# Patient Record
Sex: MALE | Race: WHITE | URBAN - METROPOLITAN AREA
[De-identification: names, ages, dates, MRNs, and addresses within clinical notes are randomized per-mention and may not be internally consistent; named-entity substitution may affect disease eponyms.]

---

## 2019-03-11 ENCOUNTER — EMERGENCY (EMERGENCY)
Facility: HOSPITAL | Age: 59
LOS: 1 days | End: 2019-03-11
Payer: COMMERCIAL

## 2019-03-11 VITALS
TEMPERATURE: 98 F | OXYGEN SATURATION: 98 % | HEART RATE: 76 BPM | RESPIRATION RATE: 16 BRPM | DIASTOLIC BLOOD PRESSURE: 85 MMHG | SYSTOLIC BLOOD PRESSURE: 126 MMHG

## 2019-03-11 PROCEDURE — 93010 ELECTROCARDIOGRAM REPORT: CPT

## 2019-03-11 PROCEDURE — 99284 EMERGENCY DEPT VISIT MOD MDM: CPT | Mod: 25

## 2019-03-11 PROCEDURE — 93005 ELECTROCARDIOGRAM TRACING: CPT

## 2019-03-11 PROCEDURE — 99283 EMERGENCY DEPT VISIT LOW MDM: CPT

## 2019-03-12 NOTE — ED ADULT NURSE REASSESSMENT NOTE - NS ED NURSE REASSESS COMMENT FT1
Patient refusing to go to CXR. States "I came here for an U/S of my leg, I'm going to have t pass this up". MD logan aware.

## 2019-03-12 NOTE — ED PROVIDER NOTE - OBJECTIVE STATEMENT
59yo M no pmh presenting with intermittent RLE pain at calf(focal) and 1 episode of 15mins of non-exertional, non-radiating cp at 8pm. Self-resolved, happened at rest, better with exertion. Pain in RLE at calf happened 5 times throughout day, seconds at a time, no triggers.

## 2019-03-12 NOTE — ED PROVIDER NOTE - CLINICAL SUMMARY MEDICAL DECISION MAKING FREE TEXT BOX
CP, unlikely MSK. RLE pain, exam unremarkable, no risk factors for clot. ACS, ekg, trop, labs, cxr. CP, unlikely MSK. RLE pain, exam unremarkable, no risk factors for clot. EKG CP, unlikely MSK. RLE pain, exam unremarkable, no risk factors for clot. EKG    LWBS by attending

## 2019-03-12 NOTE — ED ADULT NURSE REASSESSMENT NOTE - NS ED NURSE REASSESS COMMENT FT1
Patient reports he is leaving the hospital, NO Iv intact. States "I changed my mind I ant to see my cardiologist at [facility] in the morning" and continues to refuse treatment and testing regardless of education by primary RN Mary Beth. Patient was not seen by Attending MD Douglas, MD Douglas made aware. Patient is leaving ED at this time.

## 2019-03-12 NOTE — ED ADULT NURSE NOTE - OBJECTIVE STATEMENT
Patient is a 58 year old male complaining of intermittent RLE pain at calf today. Arrived by walk-in from triage. Patient has no pmhx. Patient is A&O x 4 and appears well. Pt reports pain was 1 15 min episode, that resolved on its own at rest, got better with exercise. Pain continued to come and go for several seconds at a time throughout the course of the day. Denies complaints of chest pain, sob, fevers, chills, n/v/d, headache, syncope, burning urination, blood in urine, blood in stool. Abd is soft, non tender, non distended. Skin is warm and dry. +DP pulses +2. Ketan legs appear WNL. Color is consistent with ethnicity. NAD. Safety and comfort maintained. Will continue to monitor.

## 2019-11-20 ENCOUNTER — TRANSCRIPTION ENCOUNTER (OUTPATIENT)
Age: 59
End: 2019-11-20

## 2020-12-28 ENCOUNTER — TRANSCRIPTION ENCOUNTER (OUTPATIENT)
Age: 60
End: 2020-12-28

## 2020-12-29 ENCOUNTER — TRANSCRIPTION ENCOUNTER (OUTPATIENT)
Age: 60
End: 2020-12-29

## 2022-02-04 ENCOUNTER — TRANSCRIPTION ENCOUNTER (OUTPATIENT)
Age: 62
End: 2022-02-04

## 2022-10-19 ENCOUNTER — APPOINTMENT (OUTPATIENT)
Dept: PHYSICAL MEDICINE AND REHAB | Facility: CLINIC | Age: 62
End: 2022-10-19

## 2022-10-19 ENCOUNTER — APPOINTMENT (OUTPATIENT)
Dept: ORTHOPEDIC SURGERY | Facility: CLINIC | Age: 62
End: 2022-10-19

## 2022-10-19 PROBLEM — Z00.00 ENCOUNTER FOR PREVENTIVE HEALTH EXAMINATION: Status: ACTIVE | Noted: 2022-10-19

## 2023-07-25 ENCOUNTER — EMERGENCY (EMERGENCY)
Facility: HOSPITAL | Age: 63
LOS: 0 days | Discharge: HOME | End: 2023-07-25
Attending: EMERGENCY MEDICINE
Payer: MEDICAID

## 2023-07-25 VITALS
HEART RATE: 66 BPM | RESPIRATION RATE: 17 BRPM | OXYGEN SATURATION: 98 % | TEMPERATURE: 98 F | SYSTOLIC BLOOD PRESSURE: 115 MMHG | DIASTOLIC BLOOD PRESSURE: 66 MMHG

## 2023-07-25 VITALS — HEIGHT: 74 IN | WEIGHT: 184.97 LBS

## 2023-07-25 DIAGNOSIS — Z23 ENCOUNTER FOR IMMUNIZATION: ICD-10-CM
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
## 2023-07-25 DIAGNOSIS — S06.9X1A UNSPECIFIED INTRACRANIAL INJURY WITH LOSS OF CONSCIOUSNESS OF 30 MINUTES OR LESS, INITIAL ENCOUNTER: ICD-10-CM

## 2023-07-25 DIAGNOSIS — S92.532A DISPLACED FRACTURE OF DISTAL PHALANX OF LEFT LESSER TOE(S), INITIAL ENCOUNTER FOR CLOSED FRACTURE: ICD-10-CM
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
## 2023-07-25 PROCEDURE — 70486 CT MAXILLOFACIAL W/O DYE: CPT | Mod: MA

## 2023-07-25 PROCEDURE — 72125 CT NECK SPINE W/O DYE: CPT | Mod: MA

## 2023-07-25 PROCEDURE — 90715 TDAP VACCINE 7 YRS/> IM: CPT

## 2023-07-25 PROCEDURE — 70450 CT HEAD/BRAIN W/O DYE: CPT | Mod: 26,MA

## 2023-07-25 PROCEDURE — 72125 CT NECK SPINE W/O DYE: CPT | Mod: 26,MA

## 2023-07-25 PROCEDURE — 73660 X-RAY EXAM OF TOE(S): CPT | Mod: LT

## 2023-07-25 PROCEDURE — 70450 CT HEAD/BRAIN W/O DYE: CPT | Mod: MA

## 2023-07-25 PROCEDURE — 99284 EMERGENCY DEPT VISIT MOD MDM: CPT | Mod: 25

## 2023-07-25 PROCEDURE — 73660 X-RAY EXAM OF TOE(S): CPT | Mod: 26,LT

## 2023-07-25 PROCEDURE — 76376 3D RENDER W/INTRP POSTPROCES: CPT

## 2023-07-25 PROCEDURE — 99285 EMERGENCY DEPT VISIT HI MDM: CPT

## 2023-07-25 PROCEDURE — 70486 CT MAXILLOFACIAL W/O DYE: CPT | Mod: 26,MA

## 2023-07-25 PROCEDURE — 76376 3D RENDER W/INTRP POSTPROCES: CPT | Mod: 26

## 2023-07-25 PROCEDURE — 90471 IMMUNIZATION ADMIN: CPT

## 2023-07-25 RX ORDER — CEPHALEXIN 500 MG
500 CAPSULE ORAL ONCE
Refills: 0 | Status: COMPLETED | OUTPATIENT
Start: 2023-07-25 | End: 2023-07-25

## 2023-07-25 RX ORDER — IBUPROFEN 200 MG
1 TABLET ORAL
Qty: 18 | Refills: 0
Start: 2023-07-25 | End: 2023-07-30

## 2023-07-25 RX ORDER — IBUPROFEN 200 MG
600 TABLET ORAL ONCE
Refills: 0 | Status: COMPLETED | OUTPATIENT
Start: 2023-07-25 | End: 2023-07-25

## 2023-07-25 RX ORDER — TETANUS TOXOID, REDUCED DIPHTHERIA TOXOID AND ACELLULAR PERTUSSIS VACCINE, ADSORBED 5; 2.5; 8; 8; 2.5 [IU]/.5ML; [IU]/.5ML; UG/.5ML; UG/.5ML; UG/.5ML
0.5 SUSPENSION INTRAMUSCULAR ONCE
Refills: 0 | Status: COMPLETED | OUTPATIENT
Start: 2023-07-25 | End: 2023-07-25

## 2023-07-25 RX ORDER — CEPHALEXIN 500 MG
1 CAPSULE ORAL
Qty: 28 | Refills: 0
Start: 2023-07-25 | End: 2023-07-31

## 2023-07-25 RX ADMIN — TETANUS TOXOID, REDUCED DIPHTHERIA TOXOID AND ACELLULAR PERTUSSIS VACCINE, ADSORBED 0.5 MILLILITER(S): 5; 2.5; 8; 8; 2.5 SUSPENSION INTRAMUSCULAR at 20:12

## 2023-07-25 RX ADMIN — Medication 500 MILLIGRAM(S): at 21:58

## 2023-07-25 RX ADMIN — Medication 600 MILLIGRAM(S): at 20:11

## 2023-07-25 NOTE — ED PROVIDER NOTE - PATIENT PORTAL LINK FT
You can access the FollowMyHealth Patient Portal offered by [facility] by registering at the following website: [email or web address]. By joining [employer]’s FollowMyHealth portal, you will also be able to view your health information using other applications (apps) compatible with our system.

## 2023-07-25 NOTE — ED ADULT NURSE NOTE - OBJECTIVE STATEMENT
pt c/o head injury and left foot pain/injury s/p wood fell down from ceiling/floor above him, hitting his head and landed on foot.  pt on plavix.  +LOC for 2 sec.

## 2023-07-25 NOTE — ED PROVIDER NOTE - CARE PROVIDER_API CALL
Max Chicas  Podiatric Medicine and Surgery  [address] [place] [id number]  Phone: [phone]  Fax: [phone]  Follow Up Time:

## 2023-07-25 NOTE — ED PROVIDER NOTE - OBJECTIVE STATEMENT
62 yo male with no pertinent PMHx presents to ED c/o head injury and left foot pain/injury s/p wood fell down from ceiling/floor above him. +head strike, landed on foot. on plavix. +LOC for 2 sec. 62 yo male with no pertinent PMHx presents to ED c/o head injury and left foot pain/injury s/p wood fell down from ceiling/floor above him around 2:50p today. +head strike, +LOC for about 2 sec. landed on foot. on plavix. tdap not utd. NKDA.

## 2023-07-25 NOTE — CONSULT NOTE ADULT - SUBJECTIVE AND OBJECTIVE BOX
CC: Trauma to left 3rd toe     HPI: 64 yo male with no pertinent PMHx presents to ED c/o head injury and left foot pain/injury s/p wood fell down from ceiling/floor above him around 2:50p today. +head strike, +LOC for about 2 sec. landed on foot. on plavix. tdap not utd. NKDA.    Podiatry was consulted for left 3rd toe trauma. Pt states he injured his 3rd left toe around 3pm when wooden piece fell on his head and then hit his head. Pt was in lot of pain and noticed bleeding from the site of injury.  Denies any other pedal complaints.   Denies fever/nausea/SOB    PMH: No pertinent past medical history      PSH:No significant past surgical history        Allergies:No Known Allergies            T(F): 98.5 (07-25-23 @ 18:25), Max: 98.5 (07-25-23 @ 18:25)  HR: 69 (07-25-23 @ 18:25) (69 - 69)  BP: 111/67 (07-25-23 @ 18:25) (111/67 - 111/67)  RR: 18 (07-25-23 @ 18:25) (18 - 18)  SpO2: 98% (07-25-23 @ 18:25) (98% - 98%)  Wt(kg): --    REVIEW OF SYSTEMS:    CONSTITUTIONAL: No weakness, fevers or chills  EYES: No visual changes  RESPIRATORY: No cough, wheezing; No shortness of breath  CARDIOVASCULAR: No chest pain or palpitations  GASTROINTESTINAL: No abdominal or epigastric pain. No nausea, vomiting; No diarrhea or constipation.   GENITOURINARY: No dysuria, frequency or hematuria  NEUROLOGICAL: No numbness or weakness  SKIN: See physical examination.  All other review of systems is negative unless indicated above    Physical Exam:   Constitutional: NAD, alert;  Lower Extremity Focus  Derm:  Skin warm, dry and supple bilateral.    There is ecchymosis and edema noted to the distal aspect of the dorsal and plantar left 3rd toe. There is partial thickness laceration noted to the proximal aspect of eponychium. Nail is intact. no other open lesions noted  Vascular: Dorsalis Pedis and Posterior Tibial pulses 2/4.  Capillary re-fill time less then 3 seconds digits 1-5 bilateral.    Neuro: Protective sensation intact to the level of the digits bilateral.  MSK: Muscle strength 5/5 all major muscle groups bilateral.      A: 63 yr old seen for the following:   Trauma to left 3rd toe    P:   Chart reviewed and Patient evaluated;  Discussed diagnosis and treatment with patient. Discussed that there is a wound on the site of injury and can be potential cause of injury. Explained to the patient that the nail should be removed in order to check for any hematoma that can potential cause of infection. If any open injury the laceration would be removed in order to promote healthy healing.  Attempted giving 1% plain lidocaine for local anesthesia. Pt refused and stated that he will not be able to withstand the injection and total nail avulsion procedure. Pt does not want total nail avulsion procedure or any injection for local anesthesia.  Wound was thoroughly flushed with saline and betadine.   X-rays reviewed : on wet read showing fracture of the distal phalanx of the 3rd left toe, minimally displaced.   Rod splint was applied using betadine and sterile gauze  Recommend antibiotic to prevent infection, will defer antibiotic recommendation to ER  Pt provided QAMAR surgical shoe in order to make sure he does not weight bear on his toes.  Dispensed crutches to aide with ambulation  Pt to weight bear as tolerated to the left foot using surgical shoe on the heel.  Patient demonstrated verbal understanding of all interventions and tolerated interventions well without any complications.   Pt to follow as outpt with Dr Chicas within 1 week      Case D/W attending Dr. Chicas

## 2023-07-25 NOTE — ED PROVIDER NOTE - MUSCULOSKELETAL, MLM
Spine appears normal, range of motion is not limited, no muscle or joint tenderness distal lac to dorsum of second left toe. swelling of distal. Spine appears normal, range of motion is not limited, no muscle or joint tenderness

## 2023-07-25 NOTE — ED PROVIDER NOTE - CLINICAL SUMMARY MEDICAL DECISION MAKING FREE TEXT BOX
CTs negative.  XR with fractured toe.  Patient evaluated by podiatry.  D/c home with antibiotic, motrin, f/u with podiatry as outpatient. Return precautions given.

## 2023-07-25 NOTE — ED ADULT TRIAGE NOTE - CHIEF COMPLAINT QUOTE
PT TO GO TO DR GRIFFIN'S OFFICE FROM ED DISCHARGE   pt c/o head injury and left foot pain/injury s/p wood fell down from ceiling/floor above him, hitting his head and landed on foot.  pt on plavix.  +LOC for 2 sec.

## 2023-08-01 ENCOUNTER — TRANSCRIPTION ENCOUNTER (OUTPATIENT)
Age: 63
End: 2023-08-01

## 2024-04-06 ENCOUNTER — NON-APPOINTMENT (OUTPATIENT)
Age: 64
End: 2024-04-06

## 2025-02-16 ENCOUNTER — EMERGENCY (EMERGENCY)
Facility: HOSPITAL | Age: 65
LOS: 0 days | Discharge: HOME | End: 2025-02-16
Attending: STUDENT IN AN ORGANIZED HEALTH CARE EDUCATION/TRAINING PROGRAM
Payer: COMMERCIAL

## 2025-02-16 VITALS
SYSTOLIC BLOOD PRESSURE: 150 MMHG | OXYGEN SATURATION: 98 % | HEART RATE: 82 BPM | RESPIRATION RATE: 16 BRPM | WEIGHT: 175.05 LBS | TEMPERATURE: 98 F | DIASTOLIC BLOOD PRESSURE: 85 MMHG
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
## 2025-02-16 DIAGNOSIS — I25.10 ATHEROSCLEROTIC HEART DISEASE OF NATIVE CORONARY ARTERY WITHOUT ANGINA PECTORIS: ICD-10-CM
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
## 2025-02-16 DIAGNOSIS — M75.102 UNSPECIFIED ROTATOR CUFF TEAR OR RUPTURE OF LEFT SHOULDER, NOT SPECIFIED AS TRAUMATIC: ICD-10-CM

## 2025-02-16 DIAGNOSIS — S86.012A STRAIN OF LEFT ACHILLES TENDON, INITIAL ENCOUNTER: ICD-10-CM

## 2025-02-16 DIAGNOSIS — S00.81XA ABRASION OF OTHER PART OF HEAD, INITIAL ENCOUNTER: ICD-10-CM

## 2025-02-16 DIAGNOSIS — S09.90XA UNSPECIFIED INJURY OF HEAD, INITIAL ENCOUNTER: ICD-10-CM

## 2025-02-16 DIAGNOSIS — Z79.02 LONG TERM (CURRENT) USE OF ANTITHROMBOTICS/ANTIPLATELETS: ICD-10-CM

## 2025-02-16 DIAGNOSIS — S86.011A STRAIN OF RIGHT ACHILLES TENDON, INITIAL ENCOUNTER: ICD-10-CM

## 2025-02-16 PROCEDURE — 93010 ELECTROCARDIOGRAM REPORT: CPT

## 2025-02-16 PROCEDURE — 99284 EMERGENCY DEPT VISIT MOD MDM: CPT | Mod: 25

## 2025-02-16 PROCEDURE — 99053 MED SERV 10PM-8AM 24 HR FAC: CPT

## 2025-02-16 PROCEDURE — 93005 ELECTROCARDIOGRAM TRACING: CPT

## 2025-02-16 PROCEDURE — 99284 EMERGENCY DEPT VISIT MOD MDM: CPT

## 2025-02-16 PROCEDURE — 72125 CT NECK SPINE W/O DYE: CPT | Mod: 26

## 2025-02-16 PROCEDURE — 72125 CT NECK SPINE W/O DYE: CPT | Mod: MC

## 2025-02-16 PROCEDURE — 70450 CT HEAD/BRAIN W/O DYE: CPT | Mod: MC

## 2025-02-16 PROCEDURE — 70450 CT HEAD/BRAIN W/O DYE: CPT | Mod: 26

## 2025-02-16 RX ORDER — IBUPROFEN 600 MG/1
800 TABLET, FILM COATED ORAL ONCE
Refills: 0 | Status: DISCONTINUED | OUTPATIENT
Start: 2025-02-16 | End: 2025-02-16

## 2025-02-16 NOTE — ED ADULT NURSE NOTE - NSFALLHARMRISKINTERV_ED_ALL_ED
Assistance OOB with selected safe patient handling equipment if applicable/Communicate risk of Fall with Harm to all staff, patient, and family/Monitor gait and stability/Provide patient with walking aids/Provide visual cue: red socks, yellow wristband, yellow gown, etc/Reinforce activity limits and safety measures with patient and family/Bed in lowest position, wheels locked, appropriate side rails in place/Call bell, personal items and telephone in reach/Instruct patient to call for assistance before getting out of bed/chair/stretcher/Non-slip footwear applied when patient is off stretcher/[place] to call system/Physically safe environment - no spills, clutter or unnecessary equipment/Purposeful Proactive Rounding/Room/bathroom lighting operational, light cord in reach

## 2025-02-16 NOTE — ED PROVIDER NOTE - OBJECTIVE STATEMENT
64-year-old male [facility] CAD on Plavix presents to the emergency department for injuries from a fall.  Patient states due to workplace injury, he has multiple chronic injuries including left rotator cuff tear and bilateral Achilles tendon partial tears.  He is pending surgical correction for the Achilles tendon and typically ambulates with a cane.  Upon getting up, he lost his balance and fell forward, striking his forehead on the window.  Does not believe he lost consciousness.  He complains of a burning sensation to the anterior forehead and right side of the scalp.  Patient was evaluated on arrival, called as a neuro alert by overnight physician.

## 2025-02-16 NOTE — ED PROVIDER NOTE - NSFOLLOWUPINSTRUCTIONS_ED_ALL_ED_FT
You were seen in the emergency department for injuries from a fall.  Your CT scan did not demonstrate any acute traumatic findings.  A copy of the result is attached.  Apply Neosporin or bacitracin to the abrasion on your forehead until healed.    Rest, drink plenty of fluids.  Advance activity as tolerated.  Continue all previously prescribed medications as directed.  Follow up with your primary care physician in 48-72 hours- bring copies of your results.  Return to the ER for worsening or persistent symptoms, and/or ANY NEW OR CONCERNING SYMPTOMS. If you have issues obtaining follow up, please call: [phone] to obtain a doctor or specialist who takes your insurance in your area.

## 2025-02-16 NOTE — ED PROVIDER NOTE - CLINICAL SUMMARY MEDICAL DECISION MAKING FREE TEXT BOX
64-year-old male presenting with mechanical trip and fall earlier today with frontal head strike on Plavix.  Well-appearing, small abrasion anterior forehead with tenderness extending to the right side of the scalp.  No significant bony tenderness or signs of depressed skull fracture, neurovascularly intact otherwise.  Called as a neuro alert on arrival.  Plan for CT head/C-spine, pain control, anticipate discharge.

## 2025-02-16 NOTE — ED ADULT TRIAGE NOTE - CHIEF COMPLAINT QUOTE
Pt A&Ox4, BIBEMS from home s/p mechanical trip and fall. Pt reports getting out of bed when he fell and hit his head on the wall. + LOC. Pt takes Plavix. Laceration noted to the forehead, no bleeding. Pt awaiting surgery for bilateral achilles tears. Pt c/o headache.

## 2025-02-16 NOTE — ED PROVIDER NOTE - PHYSICAL EXAMINATION
GENERAL: A&Ox4, non-toxic appearing, no acute distress  HEENT: NC, hematoma and abrasion midline forehead, tenderness to the right scalp, EOMI, oral mucosa moist, normal conjunctiva  RESP: no respiratory distress, speaking in full sentences  CV: RRR  MSK: no visible deformities, no midline spinal tenderness  NEURO: no focal sensory or motor deficits, CN 2-12 grossly intact, 5/5 strength in upper extremities, sensation intact to light touch  SKIN: warm, normal color, well perfused, no rash  PSYCH: normal affect

## 2025-02-16 NOTE — ED ADULT NURSE NOTE - OBJECTIVE STATEMENT
Pt. with PMH CAD on Plavix, presents to ED sp fall with head injury   Assessment:   General: Well appearing, VSS,  Nuro: AOX4, Mood and behavior appropriate to situation.    Skin: abrasion to the forehead .     Head & Neck: WDL  CARD:  Denies CP, peripheral pulses equal and palpable, cap refill WDL. No edema noted.   Respiratory: No respiratory distress, unlabored, normal rate and rhythm. Denies SOB, MARTIN,   GI: Abdomen soft and nondistended. No rebound tenderness or guarding.   : No reports of dysuria, frequency or urgency.   Musculoskeletal: Ambulates with cane, ROM decreased due to injury.   Safety: Pt. was instructed to call for assistance before ambulating and avoid sudden movements, Bed in low position with side rails up.

## 2025-02-16 NOTE — ED PROVIDER NOTE - PATIENT PORTAL LINK FT
You can access the FollowMyHealth Patient Portal offered by [facility] by registering at the following website: [email or web address]. By joining [employer]’s FollowMyHealth portal, you will also be able to view your health information using other applications (apps) compatible with our system.